# Patient Record
Sex: FEMALE | Race: WHITE | NOT HISPANIC OR LATINO | URBAN - METROPOLITAN AREA
[De-identification: names, ages, dates, MRNs, and addresses within clinical notes are randomized per-mention and may not be internally consistent; named-entity substitution may affect disease eponyms.]

---

## 2021-10-15 ENCOUNTER — OFFICE VISIT (OUTPATIENT)
Dept: URGENT CARE | Facility: CLINIC | Age: 26
End: 2021-10-15
Payer: OTHER GOVERNMENT

## 2021-10-15 ENCOUNTER — HOSPITAL ENCOUNTER (OUTPATIENT)
Facility: MEDICAL CENTER | Age: 26
End: 2021-10-15
Attending: PHYSICIAN ASSISTANT
Payer: MEDICAID

## 2021-10-15 VITALS
HEART RATE: 78 BPM | TEMPERATURE: 98.5 F | WEIGHT: 129 LBS | DIASTOLIC BLOOD PRESSURE: 64 MMHG | HEIGHT: 65 IN | SYSTOLIC BLOOD PRESSURE: 104 MMHG | BODY MASS INDEX: 21.49 KG/M2 | OXYGEN SATURATION: 96 % | RESPIRATION RATE: 16 BRPM

## 2021-10-15 DIAGNOSIS — R05.9 COUGH: ICD-10-CM

## 2021-10-15 LAB
COVID ORDER STATUS COVID19: NORMAL
EXTERNAL QUALITY CONTROL: NORMAL
SARS-COV+SARS-COV-2 AG RESP QL IA.RAPID: NEGATIVE
SARS-COV-2 RNA RESP QL NAA+PROBE: NOTDETECTED
SPECIMEN SOURCE: NORMAL

## 2021-10-15 PROCEDURE — 99203 OFFICE O/P NEW LOW 30 MIN: CPT | Mod: CS | Performed by: PHYSICIAN ASSISTANT

## 2021-10-15 PROCEDURE — U0005 INFEC AGEN DETEC AMPLI PROBE: HCPCS

## 2021-10-15 PROCEDURE — U0003 INFECTIOUS AGENT DETECTION BY NUCLEIC ACID (DNA OR RNA); SEVERE ACUTE RESPIRATORY SYNDROME CORONAVIRUS 2 (SARS-COV-2) (CORONAVIRUS DISEASE [COVID-19]), AMPLIFIED PROBE TECHNIQUE, MAKING USE OF HIGH THROUGHPUT TECHNOLOGIES AS DESCRIBED BY CMS-2020-01-R: HCPCS

## 2021-10-15 PROCEDURE — 87426 SARSCOV CORONAVIRUS AG IA: CPT | Performed by: PHYSICIAN ASSISTANT

## 2021-10-15 ASSESSMENT — ENCOUNTER SYMPTOMS
MYALGIAS: 1
CHILLS: 0
FEVER: 0
SORE THROAT: 1

## 2021-10-15 NOTE — PROGRESS NOTES
"  Subjective:   Mckayla Snyder is a 26 y.o. female who presents today with   Chief Complaint   Patient presents with   • Coronavirus Screening     having body aches, and a sore throat x 1 day and was exposed to COVID       Other  This is a new problem. The current episode started today. The problem occurs constantly. The problem has been unchanged. Associated symptoms include myalgias and a sore throat. Pertinent negatives include no chills or fever.     I personally donned proper PPE throughout visit today.   Patient was around her mother-in-law who was positive for Covid.  PMH:  has no past medical history on file.  MEDS: No current outpatient medications on file.  ALLERGIES: No Known Allergies  SURGHX: No past surgical history on file.  SOCHX:  Patient lives at home with her fiancé and 2 children.  FH: Reviewed with patient, not pertinent to this visit.       Review of Systems   Constitutional: Positive for malaise/fatigue. Negative for chills and fever.   HENT: Positive for sore throat.    Musculoskeletal: Positive for myalgias.        Objective:   /64 (BP Location: Left arm, Patient Position: Sitting, BP Cuff Size: Adult long)   Pulse 78   Temp 36.9 °C (98.5 °F) (Temporal)   Resp 16   Ht 1.651 m (5' 5\")   Wt 58.5 kg (129 lb)   SpO2 96%   BMI 21.47 kg/m²   Physical Exam  Vitals and nursing note reviewed.   Constitutional:       General: She is not in acute distress.     Appearance: Normal appearance. She is well-developed. She is not ill-appearing or toxic-appearing.   HENT:      Head: Normocephalic and atraumatic.      Right Ear: Hearing normal.      Left Ear: Hearing normal.      Mouth/Throat:      Pharynx: No oropharyngeal exudate or posterior oropharyngeal erythema.   Eyes:      Conjunctiva/sclera: Conjunctivae normal.   Cardiovascular:      Rate and Rhythm: Normal rate and regular rhythm.      Heart sounds: Normal heart sounds.   Pulmonary:      Effort: Pulmonary effort is normal. "      Breath sounds: Normal breath sounds. No stridor. No wheezing, rhonchi or rales.   Musculoskeletal:      Comments: Normal movement in all 4 extremities   Skin:     General: Skin is warm and dry.   Neurological:      Mental Status: She is alert.      Coordination: Coordination normal.   Psychiatric:         Mood and Affect: Mood normal.           Assessment/Plan:   Assessment    1. Cough  - POCT SARS-COV Antigen TAMARA (Symptomatic Only)  - SARS-CoV-2 PCR (24 hour In-House): Collect NP swab in [facility]; Future  Symptoms and presentation most consistent with viral illness at this time.    Did discuss with patient that her symptoms could be early onset resulting in a false negative and recommend that she quarantine given that her fiancé is positive.  Discussed CDC guidelines including self isolation at home.  Vital signs are stable at this time and patient is stable on exam.  Patient encouraged to get plenty of rest, use OTC tylenol for pain/fever, and drink plenty of fluids.  Differential diagnosis, natural history, supportive care, and indications for immediate follow-up discussed.   Patient given instructions and understanding of medications and treatment.    If not improving in 7-10 days, F/U with PCP or return to  if symptoms worsen.    Patient agreeable to plan.      Please note that this dictation was created using voice recognition software. I have made every reasonable attempt to correct obvious errors, but I expect that there are errors of grammar and possibly content that I did not discover before finalizing the note.    Miguel Novak PA-C

## 2021-10-18 NOTE — RESULT ENCOUNTER NOTE
Called the patient and was sent to [facility].  Left a message for her to view her MyChart for the most recent test results.